# Patient Record
(demographics unavailable — no encounter records)

---

## 2024-10-08 NOTE — ADDENDUM
[FreeTextEntry1] : flu panel was positive for covid. Patient informed. discussed treatment options. patient would like to be treated. Rx paxlovid BID for 5 days, discussed side effects and interaction with other medication. Patient will stop atorvastatin while taking paxlovid. Not on advair at this time.

## 2024-10-08 NOTE — HISTORY OF PRESENT ILLNESS
[FreeTextEntry8] : 60 year old male presenting with cough, congestion, runny nose, body ache for 3 days. Reports he returned from [facility] recently. Covid tested at home and was negative. Taking nyquil which helps.

## 2024-10-08 NOTE — REVIEW OF SYSTEMS
[Nasal Discharge] : nasal discharge [Sore Throat] : sore throat [Cough] : cough [Headache] : headache [Negative] : Heme/Lymph

## 2024-10-08 NOTE — HISTORY OF PRESENT ILLNESS
[FreeTextEntry8] : 60 year old male presenting with cough, congestion, runny nose, body ache for 3 days. Reports he returned from [facility] recently. Covid tested at home and was negative. Taking nyquil which helps.

## 2024-10-08 NOTE — HEALTH RISK ASSESSMENT
[Yes] : Yes [1 or 2 (0 pts)] : 1 or 2 (0 points) [0] : 2) Feeling down, depressed, or hopeless: Not at all (0) [Current] : Current [20 or more] : 20 or more

## 2024-11-26 NOTE — HISTORY OF PRESENT ILLNESS
[___ wks] : [unfilled] week(s) ago [9] : an average pain level of 9/10 [7] : a minimum pain level of 7/10 [10] : a maximum pain level of 10/10 [Back Pain] : back pain [Aching] : aching [Burning] : burning [Laying] : laying [Standing] : standing [Sitting] : sitting [Medications] : medications [FreeTextEntry1] : HPI     Mr. TACO RUGGIERO is a 60 year M with pmhx of COPD, HLD, DM2 (last A1C- 9.8). ED visit on 11/16 for acute onset severe right lateral leg pain and paresthesias. Most severe with laying and standing. No relief with Roxicodone and Flexeril. NSAIDs modestly effective. Pain is impacting his quality of life, ability to perform certain ADL's and sleep. Denies any additional weakness, numbness, bowel/bladder dysfunction, history of bleeding disorders.    Previous and current pain medications/doses/effects: Advil     Previous Pain Treatments: PT     Previous Pain Injections: None     Previous Diagnostic Studies/Images: 11/16/2024 View Order (Report matches exam selected in Patient History pane)  Exam: XR HIP RIGHT W/PELVIS (COMMON) Order#: [id number]    Radiographs of the RIGHT hip  CLINICAL INFORMATION: RIGHT hip and buttock pain..  TECHNIQUE: AP and oblique views of the hip. AP pelvis view.  FINDINGS: No prior exams are available for comparison.  No fracture or dislocation.. The femoral acetabular joint space is preserved.  No soft tissue abnormality. Osseous pelvis intact.   IMPRESSION: No radiographic osseous pathology..   ======================================================================================  11/17/2024 View Order (Report matches exam selected in Patient History pane)  Exam: XR L S SPINE AP LAT Order#: [id number]    Radiographs of the lumbar spine  CLINICAL INFORMATION: RIGHT hip and buttock pain..  TECHNIQUE: AP and lateral lumbar spine radiographs FINDINGS: No previous examinations are available for review.  Lumbar vertebral alignment is maintained. Lumbar vertebral body heights are preserved. Pedicles are intact. No fracture or vertebral subluxation..  Lumbar intervertebral disc space heights are maintained.  No significant degenerative productive changes.  The sacroiliac joints are intact.   IMPRESSION: No radiographic lumbar spine osseous pathology. If pain persist despite conservative therapy and occult fracture or spinal canal/foraminal nerve root compression is clinically suspected, further assessment with CT or MRI recommended.   [FreeTextEntry7] : right lateral leg pain

## 2024-11-26 NOTE — ASSESSMENT
[FreeTextEntry1] : >> Imaging and Other Studies   I personally reviewed the relevant imaging.  Discussed and explained to patient the likely source of pathology and pain.  Questions answered. XR  back and leg pain likely secondary to lumbar radiculopathy and discogenic pain refractory to conservative treatments including 6 consecutive weeks of home exercises/PT, will obtain MRI LS to evaluate for pathology  may consider PT vs intervention pending eval >> Therapy and Other Modalities   PT  >> Medications  tizanidine prn spasm/pain  trial gabapentin uptitrate to TID cautioned change in mood.  Encouraged to call with any worsening mood or depression/suicidal ideations acetaminophen 650mg q8h prn pain (caution <3g daily)   >> Interventions   n/a  >> Consults   na  >> Discussion of Risks/Benefits/Alternatives    >Regarding any scheduled procedures:   In the event the patient is scheduled for a procedure,   I have discussed in detail with the patient that any interventional pain procedure is associated with potential risks.  The procedure may include an injection of steroids and potentially other medications (local anesthetic and normal saline) into the epidural space or surrounding tissue of the spine.  There are significant risks of this procedure which include and are not limited to infection, bleeding, worsening pain, dural puncture leading to postdural puncture headache, nerve damage, spinal cord injury, paralysis, stroke, and death.   There is a chance that the procedure does not improve their pain.   There are risks associated with the steroid being absorbed into the body systemically.  These include dysphoria, difficulty sleeping, mood swings and personality changes.  Premenopausal women may notice an irregularity in her menstrual cycle for 2-3 months following the injection.  Steroids can specifically affect patients with hypertension, diabetes, and peptic ulcers.  The procedure may cause a temporary increase in blood pressure and blood pressure, and may adversely affect a peptic ulcer.  Other, more rare complications, include avascular necrosis of joints, glaucoma and worsening of osteoporosis.   I have discussed the risks of the procedure at length with the patient, and the potential benefits of pain relief.  I have offered alternatives to the procedure.  All questions were answered.   The patient expressed understanding and wishes to proceed with the procedure.   > Longitudinal management of Complex Painful condition   The patient is being managed for a complex condition that requires ongoing management.  The nature of this condition demands nuanced approach to treatment.  The seriousness of the condition necessitates an in-depth and focused approach to management and coordination with other healthcare professionals.    This visit involves intricate evaluation and management of the patient's condition.  The complexity of the visit was due to the need for detailed assessment of the current state, consideration of potential complications and a careful balancing of treatment options to management the chronic condition effectively.   As detailed above, the patient has a chronic significant painful condition that requires regular and detailed management.  The condition's impact on the patient's quality of life and health is substantial and necessitates a comprehensive and tailored approach   >> Conclusion   There were no barriers to communication. Informed patient that I would be available for any additional questions. Patient was instructed to call with any worsening symptoms including severe pain, new numbness/weakness, or changes in the bowel/bladder function. Discussed role of nsaids in pain management and all relevant risks, if patient is continuing to require after 4 weeks the patient should f/u for alternative treatment. Instructed patient to maintain pain diary to monitor pain level, mobility, and function.

## 2024-12-17 NOTE — HISTORY OF PRESENT ILLNESS
[Back Pain] : back pain [___ wks] : [unfilled] week(s) ago [9] : an average pain level of 9/10 [7] : a minimum pain level of 7/10 [10] : a maximum pain level of 10/10 [Aching] : aching [Burning] : burning [Laying] : laying [Standing] : standing [Sitting] : sitting [Medications] : medications [FreeTextEntry1] : Interval Note:  Since last visit the pain is not improved.  Severe back and right leg pain.  Pain is so bad that patient finds it difficult to perform adls and ambulate. Gabapentin with mild improvement. Denies any additional weakness, numbness, bowel/bladder dysfunction.    HPI     Mr. TACO RUGGIERO is a 60 year M with pmhx of COPD, HLD, DM2 (last A1C- 9.8). ED visit on 11/16 for acute onset severe right lateral leg pain and paresthesias. Most severe with laying and standing. No relief with Roxicodone and Flexeril. NSAIDs modestly effective. Pain is impacting his quality of life, ability to perform certain ADL's and sleep. Denies any additional weakness, numbness, bowel/bladder dysfunction, history of bleeding disorders.    Previous and current pain medications/doses/effects: Advil     Previous Pain Treatments: PT     Previous Pain Injections: None     Previous Diagnostic Studies/Images:  MRI LS 12/24   LOCALIZER: Within normal limits.  BONES: Scattered multilevel chronic Schmorl's node formation. More subacute appearing Schmorl's node with surrounding edema seen along the superior endplate of L5 Fatty and sclerotic endplate changes of the inferior endplate of T12. Mild fatty endplate changes at L4-L5 and L5-S1. ALIGNMENT: No spondylolisthesis.  SACROILIAC JOINTS/SACRUM: There is no sacral fracture. The SI joints are partially visualized but are intact.  CONUS AND CAUDA EQUINA: The distal cord and conus are normal in signal. Conus terminates at T12-L1.   VISUALIZED INTRAPELVIC/INTRA-ABDOMINAL SOFT TISSUES: Colonic diverticulosis.  PARASPINAL SOFT TISSUES: Mild fatty infiltration of the posterior paraspinal musculature.    INDIVIDUAL LEVELS:   LOWER THORACIC SPINE: No spinal canal or neuroforaminal stenosis.   L1-L2: No spinal canal or neuroforaminal stenosis  L2-L3: There is mild disc bulge. There is mild flattening of the ventral thecal sac. There is mild right facet arthrosis with mild neuroforaminal narrowing. There is no spinal canal stenosis. There is no left neuroforaminal stenosis.  L3-L4: There is mild disc bulge. There is mild flattening of the ventral thecal sac. There is no spinal canal stenosis. Mild bilateral neural foraminal narrowing, greater on the left.  L4-L5: Diffuse disc bulge, asymmetric to the right with a superimposed central disc protrusion with annular fissure. There is moderate to severe right neuroforaminal narrowing. There is flattening of the ventral thecal sac with effacement of the left greater than right lateral recesses. Overall moderate central canal narrowing. There is mild left neuroforaminal stenosis.  L5-S1: Diffuse disc bulge posterior osseous ridging. Small left foraminal disc protrusion. Mild bilateral facet arthrosis. Findings result in severe left and mild right neural foraminal narrowing. Flattening of the ventral thecal sac without central canal narrowing.    IMPRESSION:   Multilevel lumbar spondylosis.   L4-L5: Central disc protrusion with annular fissure. Moderate to severe right neuroforaminal narrowing. There is flattening of the ventral thecal sac with effacement of the left greater than right lateral recesses. Overall moderate central canal narrowing. Mild left neuroforaminal stenosis.   L5-S1: Small left foraminal disc protrusion. Severe left and mild right neural foraminal narrowing.  11/16/2024 View Order (Report matches exam selected in Patient History pane)  Exam: XR HIP RIGHT W/PELVIS (COMMON) Order#: [id number]    Radiographs of the RIGHT hip  CLINICAL INFORMATION: RIGHT hip and buttock pain..  TECHNIQUE: AP and oblique views of the hip. AP pelvis view.  FINDINGS: No prior exams are available for comparison.  No fracture or dislocation.. The femoral acetabular joint space is preserved.  No soft tissue abnormality. Osseous pelvis intact.   IMPRESSION: No radiographic osseous pathology..   ======================================================================================  11/17/2024 View Order (Report matches exam selected in Patient History pane)  Exam: XR L S SPINE AP LAT Order#: [id number]    Radiographs of the lumbar spine  CLINICAL INFORMATION: RIGHT hip and buttock pain..  TECHNIQUE: AP and lateral lumbar spine radiographs FINDINGS: No previous examinations are available for review.  Lumbar vertebral alignment is maintained. Lumbar vertebral body heights are preserved. Pedicles are intact. No fracture or vertebral subluxation..  Lumbar intervertebral disc space heights are maintained.  No significant degenerative productive changes.  The sacroiliac joints are intact.   IMPRESSION: No radiographic lumbar spine osseous pathology. If pain persist despite conservative therapy and occult fracture or spinal canal/foraminal nerve root compression is clinically suspected, further assessment with CT or MRI recommended.   [FreeTextEntry7] : right lateral leg pain

## 2024-12-17 NOTE — ASSESSMENT
[FreeTextEntry1] : >> Imaging and Other Studies   I personally reviewed the relevant imaging.  Discussed and explained to patient the likely source of pathology and pain.  Questions answered. XR MRI   >> Therapy and Other Modalities   PT  >> Medications  tizanidine prn spasm/pain  gabapentin uptitrate to TID cautioned change in mood.  Encouraged to call with any worsening mood or depression/suicidal ideations acetaminophen 650mg q8h prn pain (caution <3g daily)   >> Interventions   Significant component of back and leg pain likely secondary to lumbar spinal stenosis demonstrated on MRI LS.  Will schedule L4-5 interlaminar epidural steroid injection r/b/a discussed   >> Consults   na  >> Discussion of Risks/Benefits/Alternatives    >Regarding any scheduled procedures:   In the event the patient is scheduled for a procedure,   I have discussed in detail with the patient that any interventional pain procedure is associated with potential risks.  The procedure may include an injection of steroids and potentially other medications (local anesthetic and normal saline) into the epidural space or surrounding tissue of the spine.  There are significant risks of this procedure which include and are not limited to infection, bleeding, worsening pain, dural puncture leading to postdural puncture headache, nerve damage, spinal cord injury, paralysis, stroke, and death.   There is a chance that the procedure does not improve their pain.   There are risks associated with the steroid being absorbed into the body systemically.  These include dysphoria, difficulty sleeping, mood swings and personality changes.  Premenopausal women may notice an irregularity in her menstrual cycle for 2-3 months following the injection.  Steroids can specifically affect patients with hypertension, diabetes, and peptic ulcers.  The procedure may cause a temporary increase in blood pressure and blood pressure, and may adversely affect a peptic ulcer.  Other, more rare complications, include avascular necrosis of joints, glaucoma and worsening of osteoporosis.   I have discussed the risks of the procedure at length with the patient, and the potential benefits of pain relief.  I have offered alternatives to the procedure.  All questions were answered.   The patient expressed understanding and wishes to proceed with the procedure.   > Longitudinal management of Complex Painful condition   The patient is being managed for a complex condition that requires ongoing management.  The nature of this condition demands nuanced approach to treatment.  The seriousness of the condition necessitates an in-depth and focused approach to management and coordination with other healthcare professionals.    This visit involves intricate evaluation and management of the patient's condition.  The complexity of the visit was due to the need for detailed assessment of the current state, consideration of potential complications and a careful balancing of treatment options to management the chronic condition effectively.   As detailed above, the patient has a chronic significant painful condition that requires regular and detailed management.  The condition's impact on the patient's quality of life and health is substantial and necessitates a comprehensive and tailored approach   >> Conclusion   There were no barriers to communication. Informed patient that I would be available for any additional questions. Patient was instructed to call with any worsening symptoms including severe pain, new numbness/weakness, or changes in the bowel/bladder function. Discussed role of nsaids in pain management and all relevant risks, if patient is continuing to require after 4 weeks the patient should f/u for alternative treatment. Instructed patient to maintain pain diary to monitor pain level, mobility, and function.

## 2025-01-03 NOTE — ASSESSMENT
[FreeTextEntry1] : >> Imaging and Other Studies   I personally reviewed the relevant imaging.  Discussed and explained to patient the likely source of pathology and pain.  Questions answered. XR MRI   >> Therapy and Other Modalities   PT  >> Medications  tizanidine prn spasm/pain restart gabapentin cautioned change in mood.  Encouraged to call with any worsening mood or depression/suicidal ideations acetaminophen 650mg q8h prn pain (caution <3g daily)   >> Interventions   Significant component of back and leg pain likely secondary to lumbar spinal stenosis demonstrated on MRI LS.  sp L4-5 interlaminar epidural steroid injection with 70%  improvement in back and leg pain    >> Consults   na  >> Discussion of Risks/Benefits/Alternatives    >Regarding any scheduled procedures:   In the event the patient is scheduled for a procedure,   I have discussed in detail with the patient that any interventional pain procedure is associated with potential risks.  The procedure may include an injection of steroids and potentially other medications (local anesthetic and normal saline) into the epidural space or surrounding tissue of the spine.  There are significant risks of this procedure which include and are not limited to infection, bleeding, worsening pain, dural puncture leading to postdural puncture headache, nerve damage, spinal cord injury, paralysis, stroke, and death.   There is a chance that the procedure does not improve their pain.   There are risks associated with the steroid being absorbed into the body systemically.  These include dysphoria, difficulty sleeping, mood swings and personality changes.  Premenopausal women may notice an irregularity in her menstrual cycle for 2-3 months following the injection.  Steroids can specifically affect patients with hypertension, diabetes, and peptic ulcers.  The procedure may cause a temporary increase in blood pressure and blood pressure, and may adversely affect a peptic ulcer.  Other, more rare complications, include avascular necrosis of joints, glaucoma and worsening of osteoporosis.   I have discussed the risks of the procedure at length with the patient, and the potential benefits of pain relief.  I have offered alternatives to the procedure.  All questions were answered.   The patient expressed understanding and wishes to proceed with the procedure.   > Longitudinal management of Complex Painful condition   The patient is being managed for a complex condition that requires ongoing management.  The nature of this condition demands nuanced approach to treatment.  The seriousness of the condition necessitates an in-depth and focused approach to management and coordination with other healthcare professionals.    This visit involves intricate evaluation and management of the patient's condition.  The complexity of the visit was due to the need for detailed assessment of the current state, consideration of potential complications and a careful balancing of treatment options to management the chronic condition effectively.   As detailed above, the patient has a chronic significant painful condition that requires regular and detailed management.  The condition's impact on the patient's quality of life and health is substantial and necessitates a comprehensive and tailored approach   >> Conclusion   There were no barriers to communication. Informed patient that I would be available for any additional questions. Patient was instructed to call with any worsening symptoms including severe pain, new numbness/weakness, or changes in the bowel/bladder function. Discussed role of nsaids in pain management and all relevant risks, if patient is continuing to require after 4 weeks the patient should f/u for alternative treatment. Instructed patient to maintain pain diary to monitor pain level, mobility, and function.

## 2025-01-03 NOTE — HISTORY OF PRESENT ILLNESS
[Back Pain] : back pain [___ wks] : [unfilled] week(s) ago [9] : an average pain level of 9/10 [7] : a minimum pain level of 7/10 [10] : a maximum pain level of 10/10 [Aching] : aching [Burning] : burning [Laying] : laying [Standing] : standing [Sitting] : sitting [Medications] : medications [FreeTextEntry1] : Interval Note: sp  L4-5 interlaminar epidural steroid injection with 70% improvement in back and right leg pain.  Minimal difficulty performing adls.  Denies any additional weakness, numbness, bowel/bladder dysfunction.    HPI     Mr. TACO RUGGIERO is a 60 year M with pmhx of COPD, HLD, DM2 (last A1C- 9.8). ED visit on 11/16 for acute onset severe right lateral leg pain and paresthesias. Most severe with laying and standing. No relief with Roxicodone and Flexeril. NSAIDs modestly effective. Pain is impacting his quality of life, ability to perform certain ADL's and sleep. Denies any additional weakness, numbness, bowel/bladder dysfunction, history of bleeding disorders.    Previous and current pain medications/doses/effects: Advil     Previous Pain Treatments: PT     Previous Pain Injections:    L4-5 interlaminar epidural steroid injection     Previous Diagnostic Studies/Images:  MRI LS 12/24   LOCALIZER: Within normal limits.  BONES: Scattered multilevel chronic Schmorl's node formation. More subacute appearing Schmorl's node with surrounding edema seen along the superior endplate of L5 Fatty and sclerotic endplate changes of the inferior endplate of T12. Mild fatty endplate changes at L4-L5 and L5-S1. ALIGNMENT: No spondylolisthesis.  SACROILIAC JOINTS/SACRUM: There is no sacral fracture. The SI joints are partially visualized but are intact.  CONUS AND CAUDA EQUINA: The distal cord and conus are normal in signal. Conus terminates at T12-L1.   VISUALIZED INTRAPELVIC/INTRA-ABDOMINAL SOFT TISSUES: Colonic diverticulosis.  PARASPINAL SOFT TISSUES: Mild fatty infiltration of the posterior paraspinal musculature.    INDIVIDUAL LEVELS:   LOWER THORACIC SPINE: No spinal canal or neuroforaminal stenosis.   L1-L2: No spinal canal or neuroforaminal stenosis  L2-L3: There is mild disc bulge. There is mild flattening of the ventral thecal sac. There is mild right facet arthrosis with mild neuroforaminal narrowing. There is no spinal canal stenosis. There is no left neuroforaminal stenosis.  L3-L4: There is mild disc bulge. There is mild flattening of the ventral thecal sac. There is no spinal canal stenosis. Mild bilateral neural foraminal narrowing, greater on the left.  L4-L5: Diffuse disc bulge, asymmetric to the right with a superimposed central disc protrusion with annular fissure. There is moderate to severe right neuroforaminal narrowing. There is flattening of the ventral thecal sac with effacement of the left greater than right lateral recesses. Overall moderate central canal narrowing. There is mild left neuroforaminal stenosis.  L5-S1: Diffuse disc bulge posterior osseous ridging. Small left foraminal disc protrusion. Mild bilateral facet arthrosis. Findings result in severe left and mild right neural foraminal narrowing. Flattening of the ventral thecal sac without central canal narrowing.    IMPRESSION:   Multilevel lumbar spondylosis.   L4-L5: Central disc protrusion with annular fissure. Moderate to severe right neuroforaminal narrowing. There is flattening of the ventral thecal sac with effacement of the left greater than right lateral recesses. Overall moderate central canal narrowing. Mild left neuroforaminal stenosis.   L5-S1: Small left foraminal disc protrusion. Severe left and mild right neural foraminal narrowing.  11/16/2024 View Order (Report matches exam selected in Patient History pane)  Exam: XR HIP RIGHT W/PELVIS (COMMON) Order#: [id number]    Radiographs of the RIGHT hip  CLINICAL INFORMATION: RIGHT hip and buttock pain..  TECHNIQUE: AP and oblique views of the hip. AP pelvis view.  FINDINGS: No prior exams are available for comparison.  No fracture or dislocation.. The femoral acetabular joint space is preserved.  No soft tissue abnormality. Osseous pelvis intact.   IMPRESSION: No radiographic osseous pathology..   ======================================================================================  11/17/2024 View Order (Report matches exam selected in Patient History pane)  Exam: XR L S SPINE AP LAT Order#: [id number]    Radiographs of the lumbar spine  CLINICAL INFORMATION: RIGHT hip and buttock pain..  TECHNIQUE: AP and lateral lumbar spine radiographs FINDINGS: No previous examinations are available for review.  Lumbar vertebral alignment is maintained. Lumbar vertebral body heights are preserved. Pedicles are intact. No fracture or vertebral subluxation..  Lumbar intervertebral disc space heights are maintained.  No significant degenerative productive changes.  The sacroiliac joints are intact.   IMPRESSION: No radiographic lumbar spine osseous pathology. If pain persist despite conservative therapy and occult fracture or spinal canal/foraminal nerve root compression is clinically suspected, further assessment with CT or MRI recommended.   [FreeTextEntry7] : right lateral leg pain

## 2025-01-14 NOTE — REVIEW OF SYSTEMS
[Recent Weight Loss (___ Lbs)] : recent weight loss: [unfilled] lbs [Negative] : Heme/Lymph [FreeTextEntry6] : excessive snoring had negative sleep study few years ago.

## 2025-01-14 NOTE — HISTORY OF PRESENT ILLNESS
[FreeTextEntry1] : 61 yo WM  hx of Type 2 DM uncontrolled, noncompliant patient, today in follow up, did not take am insulin as he was "in a hurry" reports forgetting am shot about twice a week  feels tired, weak , despite decreasing Trulicity to 1,5mg every other week as 4.5mg was making him nauseated and having no appetite.  patient stopped Trulicity as he was concerned that he lost too much weight, restarted 2 weeks ago  was taking Humalog only once a day and no oral antidiabetics , denies depression, but has had polyuria, polydipsia and weight loss per pt and his wife today present  missing evening insulin and morning one about 4-5 times in the last month per pt  has h/o noncompliance   last seen May 2020       was  on Ozempic 1mg weekly started 2/1/19 no side effects this replaced Tanzeum stopped May 2020 misunderstood my advise today A1c out of control 10.5        Invokana 300mg tab TAKE 1 TABLET BY MOUTH EVERY DAY however in the chat last Rx was 2/2020          Metformin 1g bid Pioglitazone 45mg qam  Humalog mix 75/25 10 units before breakfast and 10units before dinner bid  Trulicity 3 mg q week           Hx of Type 2 DM for 4-5 years         Repeat A1c 8.9 12/18, 8.4 on 2/20        prior regimen : " VGo 40 since 9/2016, On Actoplus 15-850mg PO BID, Invokana 300mg PO Qdaily. Tolerating medications well with no sign. side effects. Drinking plenty of water. Takes at night. Only wakes up 1-2 x per night. No UTI, Fungal, No dizziness, no hematuria."        Did not bring in meter or glucometer. I check "if I feel a little off"        Checks about 2-3 x per week        Checks BG levels via freestyle yadi getting better for the last few days in 150-200s                BP well controlled. 128/70. Currently on no ACEi.         Cr 0.77, eGFR >60. AST 16, ALT 24, Alk phos 101.         Currently on Lipitor and Lovaza 2g PO Qdaily.        Last opthalmology 1months ago. Dr. Starkey. Wears reading glasses. No blurry vision or loss of vision.         Denies neuropathy.         TSH 1.15        thyroid US no nodules 5/2015        Hgb 17.0 nl is 16.3, Hct 50.4. No snoring at night. Sent for Sleep study with Shivani Harvey. Neg for sleep apnea.         Blood work completed 12/19/16:        WBC 10.2, Hgb 17.0, Hct 50.4,Plt 268        UA + glucose (on invokana)        CMP: Na 142, k 4.5, bun 9, cr 0.77, LFTs nl        ,  not fasting  4/23/24 follow up-  Sugar 160 today.  A1c went up to 9.7%.  Freestyle Yadi 3 reviewed with pt. He has highs above 350.  When numbers are normal he does not feel good. Not used to it. Feels symptomatic at 115.  He does enjoy using the Yadi and finds it helpful.   Meds:  - Humalog Mix 14-15 U before breakfast and 14-15 U before dinner. Reports 90% compliance. He takes it at different times every day depending on his work schedule. Sometimes has to eat at work without his medications and then waits until getting home to take the shot.  - Trulicity 1.5 mg every other week. Switched from weekly to biweekly because he was losing weight.  - Pioglitazone 45 mg qd  - Metformin 500 mg BID  - Invokana 300 mg qd   01/14/25 He is doing okay. He pulled his back right and had to an epidural from Dr. Ngo in his spine a week before Christmas. MRI Lumbar Spine was done in December 2024. He was on Gabapentin and muscle relaxants. He states he is frequently tired. He feels he is experiencing polyuria and polydipsia. He denies any chest pain, heart racing, double vision, blurry vision.

## 2025-01-14 NOTE — PHYSICAL EXAM
[Alert] : alert [Well Nourished] : well nourished [No Acute Distress] : no acute distress [Well Developed] : well developed [Normal Sclera/Conjunctiva] : normal sclera/conjunctiva [EOMI] : extra ocular movement intact [No Proptosis] : no proptosis [Normal Oropharynx] : the oropharynx was normal [No Thyroid Nodules] : no palpable thyroid nodules [No Respiratory Distress] : no respiratory distress [No Accessory Muscle Use] : no accessory muscle use [Clear to Auscultation] : lungs were clear to auscultation bilaterally [Normal S1, S2] : normal S1 and S2 [Normal Rate] : heart rate was normal [No Edema] : no peripheral edema [Normal Bowel Sounds] : normal bowel sounds [Not Tender] : non-tender [Not Distended] : not distended [Soft] : abdomen soft [Normal Anterior Cervical Nodes] : no anterior cervical lymphadenopathy [No Spinal Tenderness] : no spinal tenderness [Spine Straight] : spine straight [No Stigmata of Cushings Syndrome] : no stigmata of Cushings Syndrome [Normal Gait] : normal gait [Normal Strength/Tone] : muscle strength and tone were normal [No Rash] : no rash [Normal Reflexes] : deep tendon reflexes were 2+ and symmetric [No Tremors] : no tremors [Oriented x3] : oriented to person, place, and time [Normal PMI] : the apical impulse was normal [Regular Rhythm] : with a regular rhythm [Acanthosis Nigricans] : no acanthosis nigricans [de-identified] : mildly enlarged thyroid on exam  [de-identified] : Slight heart murmur

## 2025-01-14 NOTE — END OF VISIT
[Time Spent: ___ minutes] : I have spent [unfilled] minutes of time on the encounter which excludes teaching and separately reported services. [FreeTextEntry3] :  I, Meagan Ireland, am scribing for and in the presence of Dr. Mirlande Garcia in the following sections: HISTORY OF PRESENT ILLNESS; REVIEW OF SYSTEMS; PHYSICAL EXAM; ASSESSMENT/ PLAN. I, Mirlande Garcia, personally performed the services described in the documentation, reviewed the documentation recorded by the scribe in my presence, and it accurately and completely records my words and actions. 01/14/25

## 2025-02-04 NOTE — PHYSICAL EXAM
[Normal muscle bulk without asymmetry] : normal muscle bulk without asymmetry [Normal] : Gait: normal [] : Motor: [Facet Tenderness] : no facet tenderness

## 2025-02-04 NOTE — HISTORY OF PRESENT ILLNESS
[Back Pain] : back pain [___ wks] : [unfilled] week(s) ago [9] : an average pain level of 9/10 [7] : a minimum pain level of 7/10 [10] : a maximum pain level of 10/10 [Aching] : aching [Burning] : burning [Laying] : laying [Standing] : standing [Sitting] : sitting [Medications] : medications [FreeTextEntry1] : Interval Note:  sp  L4-5 interlaminar epidural steroid injection with 70% improvement in back and right leg pain. Continues gabapentin without medication adverse effects. Completed PT, continues to require tizanidine prn  Minimal difficulty performing adls.  Denies any additional weakness, numbness, bowel/bladder dysfunction.    HPI     Mr. TACO RUGGIERO is a 60 year M with pmhx of COPD, HLD, DM2 (last A1C- 9.8). ED visit on 11/16 for acute onset severe right lateral leg pain and paresthesias. Most severe with laying and standing. No relief with Roxicodone and Flexeril. NSAIDs modestly effective. Pain is impacting his quality of life, ability to perform certain ADL's and sleep. Denies any additional weakness, numbness, bowel/bladder dysfunction, history of bleeding disorders.    Previous and current pain medications/doses/effects: Advil     Previous Pain Treatments: PT     Previous Pain Injections:    L4-5 interlaminar epidural steroid injection     Previous Diagnostic Studies/Images:  MRI LS 12/24   LOCALIZER: Within normal limits.  BONES: Scattered multilevel chronic Schmorl's node formation. More subacute appearing Schmorl's node with surrounding edema seen along the superior endplate of L5 Fatty and sclerotic endplate changes of the inferior endplate of T12. Mild fatty endplate changes at L4-L5 and L5-S1. ALIGNMENT: No spondylolisthesis.  SACROILIAC JOINTS/SACRUM: There is no sacral fracture. The SI joints are partially visualized but are intact.  CONUS AND CAUDA EQUINA: The distal cord and conus are normal in signal. Conus terminates at T12-L1.   VISUALIZED INTRAPELVIC/INTRA-ABDOMINAL SOFT TISSUES: Colonic diverticulosis.  PARASPINAL SOFT TISSUES: Mild fatty infiltration of the posterior paraspinal musculature.    INDIVIDUAL LEVELS:   LOWER THORACIC SPINE: No spinal canal or neuroforaminal stenosis.   L1-L2: No spinal canal or neuroforaminal stenosis  L2-L3: There is mild disc bulge. There is mild flattening of the ventral thecal sac. There is mild right facet arthrosis with mild neuroforaminal narrowing. There is no spinal canal stenosis. There is no left neuroforaminal stenosis.  L3-L4: There is mild disc bulge. There is mild flattening of the ventral thecal sac. There is no spinal canal stenosis. Mild bilateral neural foraminal narrowing, greater on the left.  L4-L5: Diffuse disc bulge, asymmetric to the right with a superimposed central disc protrusion with annular fissure. There is moderate to severe right neuroforaminal narrowing. There is flattening of the ventral thecal sac with effacement of the left greater than right lateral recesses. Overall moderate central canal narrowing. There is mild left neuroforaminal stenosis.  L5-S1: Diffuse disc bulge posterior osseous ridging. Small left foraminal disc protrusion. Mild bilateral facet arthrosis. Findings result in severe left and mild right neural foraminal narrowing. Flattening of the ventral thecal sac without central canal narrowing.    IMPRESSION:   Multilevel lumbar spondylosis.   L4-L5: Central disc protrusion with annular fissure. Moderate to severe right neuroforaminal narrowing. There is flattening of the ventral thecal sac with effacement of the left greater than right lateral recesses. Overall moderate central canal narrowing. Mild left neuroforaminal stenosis.   L5-S1: Small left foraminal disc protrusion. Severe left and mild right neural foraminal narrowing.  11/16/2024 View Order (Report matches exam selected in Patient History pane)  Exam: XR HIP RIGHT W/PELVIS (COMMON) Order#: [id number]    Radiographs of the RIGHT hip  CLINICAL INFORMATION: RIGHT hip and buttock pain..  TECHNIQUE: AP and oblique views of the hip. AP pelvis view.  FINDINGS: No prior exams are available for comparison.  No fracture or dislocation.. The femoral acetabular joint space is preserved.  No soft tissue abnormality. Osseous pelvis intact.   IMPRESSION: No radiographic osseous pathology..   ======================================================================================  11/17/2024 View Order (Report matches exam selected in Patient History pane)  Exam: XR L S SPINE AP LAT Order#: [id number]    Radiographs of the lumbar spine  CLINICAL INFORMATION: RIGHT hip and buttock pain..  TECHNIQUE: AP and lateral lumbar spine radiographs FINDINGS: No previous examinations are available for review.  Lumbar vertebral alignment is maintained. Lumbar vertebral body heights are preserved. Pedicles are intact. No fracture or vertebral subluxation..  Lumbar intervertebral disc space heights are maintained.  No significant degenerative productive changes.  The sacroiliac joints are intact.   IMPRESSION: No radiographic lumbar spine osseous pathology. If pain persist despite conservative therapy and occult fracture or spinal canal/foraminal nerve root compression is clinically suspected, further assessment with CT or MRI recommended.   [FreeTextEntry7] : right lateral leg pain

## 2025-02-04 NOTE — ASSESSMENT
[FreeTextEntry1] : >> Imaging and Other Studies   I personally reviewed the relevant imaging.  Discussed and explained to patient the likely source of pathology and pain.  Questions answered. XR MRI   >> Therapy and Other Modalities   PT  >> Medications  wean tizanidine prn spasm/pain continue gabapentin cautioned change in mood.  Encouraged to call with any worsening mood or depression/suicidal ideations acetaminophen 650mg q8h prn pain (caution <3g daily)   >> Interventions   Significant component of back and leg pain likely secondary to lumbar spinal stenosis demonstrated on MRI LS.  sp L4-5 interlaminar epidural steroid injection with 70%  improvement in back and leg pain    >> Consults   no work restrictions as patient has full function and minimal pain  >> Discussion of Risks/Benefits/Alternatives    >Regarding any scheduled procedures:   In the event the patient is scheduled for a procedure,   I have discussed in detail with the patient that any interventional pain procedure is associated with potential risks.  The procedure may include an injection of steroids and potentially other medications (local anesthetic and normal saline) into the epidural space or surrounding tissue of the spine.  There are significant risks of this procedure which include and are not limited to infection, bleeding, worsening pain, dural puncture leading to postdural puncture headache, nerve damage, spinal cord injury, paralysis, stroke, and death.   There is a chance that the procedure does not improve their pain.   There are risks associated with the steroid being absorbed into the body systemically.  These include dysphoria, difficulty sleeping, mood swings and personality changes.  Premenopausal women may notice an irregularity in her menstrual cycle for 2-3 months following the injection.  Steroids can specifically affect patients with hypertension, diabetes, and peptic ulcers.  The procedure may cause a temporary increase in blood pressure and blood pressure, and may adversely affect a peptic ulcer.  Other, more rare complications, include avascular necrosis of joints, glaucoma and worsening of osteoporosis.   I have discussed the risks of the procedure at length with the patient, and the potential benefits of pain relief.  I have offered alternatives to the procedure.  All questions were answered.   The patient expressed understanding and wishes to proceed with the procedure.   > Longitudinal management of Complex Painful condition   The patient is being managed for a complex condition that requires ongoing management.  The nature of this condition demands nuanced approach to treatment.  The seriousness of the condition necessitates an in-depth and focused approach to management and coordination with other healthcare professionals.    This visit involves intricate evaluation and management of the patient's condition.  The complexity of the visit was due to the need for detailed assessment of the current state, consideration of potential complications and a careful balancing of treatment options to management the chronic condition effectively.   As detailed above, the patient has a chronic significant painful condition that requires regular and detailed management.  The condition's impact on the patient's quality of life and health is substantial and necessitates a comprehensive and tailored approach   >> Conclusion   There were no barriers to communication. Informed patient that I would be available for any additional questions. Patient was instructed to call with any worsening symptoms including severe pain, new numbness/weakness, or changes in the bowel/bladder function. Discussed role of nsaids in pain management and all relevant risks, if patient is continuing to require after 4 weeks the patient should f/u for alternative treatment. Instructed patient to maintain pain diary to monitor pain level, mobility, and function.

## 2025-03-17 NOTE — ASSESSMENT
[Diabetes Foot Care] : diabetes foot care [Carbohydrate Consistent Diet] : carbohydrate consistent diet [Exercise/Effect on Glucose] : exercise/effect on glucose [Self Monitoring of Blood Glucose] : self monitoring of blood glucose [Retinopathy Screening] : Patient was referred to ophthalmology for retinopathy screening [Little interest or pleasure doing things] : 1) Little interest or pleasure doing things [Feeling down, depressed, or hopeless] : 2) Feeling down, depressed, or hopeless [0] : 2) Feeling down, depressed, or hopeless: Not at all (0) [PHQ-2 Negative - No further assessment needed] : PHQ-2 Negative - No further assessment needed [FreeTextEntry1] : 5min spent on depression screening [[id number]] : 0

## 2025-03-17 NOTE — HISTORY OF PRESENT ILLNESS
[FreeTextEntry1] : 59 yo WM  hx of Type 2 DM uncontrolled, noncompliant patient, today in follow up, did not take am insulin as he was "in a hurry" reports forgetting am shot about twice a week  feels tired, weak , despite decreasing Trulicity to 1,5mg every other week as 4.5mg was making him nauseated and having no appetite.  patient stopped Trulicity as he was concerned that he lost too much weight, restarted 2 weeks ago  was taking Humalog only once a day and no oral antidiabetics , denies depression, but has had polyuria, polydipsia and weight loss per pt and his wife today present  missing evening insulin and morning one about 4-5 times in the last month per pt  has h/o noncompliance   last seen May 2020       was  on Ozempic 1mg weekly started 2/1/19 no side effects this replaced Tanzeum stopped May 2020 misunderstood my advise today A1c out of control 10.5        Invokana 300mg tab TAKE 1 TABLET BY MOUTH EVERY DAY however in the chat last Rx was 2/2020          Metformin 1g bid Pioglitazone 45mg qam  Humalog mix 75/25 10 units before breakfast and 10units before dinner bid  Trulicity 3 mg q week           Hx of Type 2 DM for 4-5 years         Repeat A1c 8.9 12/18, 8.4 on 2/20        prior regimen : " VGo 40 since 9/2016, On Actoplus 15-850mg PO BID, Invokana 300mg PO Qdaily. Tolerating medications well with no sign. side effects. Drinking plenty of water. Takes at night. Only wakes up 1-2 x per night. No UTI, Fungal, No dizziness, no hematuria."        Did not bring in meter or glucometer. I check "if I feel a little off"        Checks about 2-3 x per week        Checks BG levels via freestyle yadi getting better for the last few days in 150-200s                BP well controlled. 128/70. Currently on no ACEi.         Cr 0.77, eGFR >60. AST 16, ALT 24, Alk phos 101.         Currently on Lipitor and Lovaza 2g PO Qdaily.        Last opthalmology 1months ago. Dr. Starkey. Wears reading glasses. No blurry vision or loss of vision.         Denies neuropathy.         TSH 1.15        thyroid US no nodules 5/2015        Hgb 17.0 nl is 16.3, Hct 50.4. No snoring at night. Sent for Sleep study with Shivani Harvey. Neg for sleep apnea.         Blood work completed 12/19/16:        WBC 10.2, Hgb 17.0, Hct 50.4,Plt 268        UA + glucose (on invokana)        CMP: Na 142, k 4.5, bun 9, cr 0.77, LFTs nl        ,  not fasting  4/23/24 follow up-  Sugar 160 today.  A1c went up to 9.7%.  Freestyle Yadi 3 reviewed with pt. He has highs above 350.  When numbers are normal he does not feel good. Not used to it. Feels symptomatic at 115.  He does enjoy using the Yadi and finds it helpful.   Meds:  - Humalog Mix 14-15 U before breakfast and 14-15 U before dinner. Reports 90% compliance. He takes it at different times every day depending on his work schedule. Sometimes has to eat at work without his medications and then waits until getting home to take the shot.  - Trulicity 1.5 mg every other week. Switched from weekly to biweekly because he was losing weight.  - Pioglitazone 45 mg qd  - Metformin 500 mg BID  - Invokana 300 mg qd   01/14/25 He is doing okay. He pulled his back right and had to an epidural from Dr. Ngo in his spine a week before Christmas. MRI Lumbar Spine was done in December 2024. He was on Gabapentin and muscle relaxants. He states he is frequently tired. He feels he is experiencing polyuria and polydipsia. He denies any chest pain, heart racing, double vision, blurry vision.   03/10/25 Patient is okay. He pulled a muscle in his back while helping nephew. Saw Dr. Ngo and got a steroid shot February 4th.

## 2025-03-17 NOTE — HISTORY OF PRESENT ILLNESS
[FreeTextEntry1] : 61 yo WM  hx of Type 2 DM uncontrolled, noncompliant patient, today in follow up, did not take am insulin as he was "in a hurry" reports forgetting am shot about twice a week  feels tired, weak , despite decreasing Trulicity to 1,5mg every other week as 4.5mg was making him nauseated and having no appetite.  patient stopped Trulicity as he was concerned that he lost too much weight, restarted 2 weeks ago  was taking Humalog only once a day and no oral antidiabetics , denies depression, but has had polyuria, polydipsia and weight loss per pt and his wife today present  missing evening insulin and morning one about 4-5 times in the last month per pt  has h/o noncompliance   last seen May 2020       was  on Ozempic 1mg weekly started 2/1/19 no side effects this replaced Tanzeum stopped May 2020 misunderstood my advise today A1c out of control 10.5        Invokana 300mg tab TAKE 1 TABLET BY MOUTH EVERY DAY however in the chat last Rx was 2/2020          Metformin 1g bid Pioglitazone 45mg qam  Humalog mix 75/25 10 units before breakfast and 10units before dinner bid  Trulicity 3 mg q week           Hx of Type 2 DM for 4-5 years         Repeat A1c 8.9 12/18, 8.4 on 2/20        prior regimen : " VGo 40 since 9/2016, On Actoplus 15-850mg PO BID, Invokana 300mg PO Qdaily. Tolerating medications well with no sign. side effects. Drinking plenty of water. Takes at night. Only wakes up 1-2 x per night. No UTI, Fungal, No dizziness, no hematuria."        Did not bring in meter or glucometer. I check "if I feel a little off"        Checks about 2-3 x per week        Checks BG levels via freestyle yadi getting better for the last few days in 150-200s                BP well controlled. 128/70. Currently on no ACEi.         Cr 0.77, eGFR >60. AST 16, ALT 24, Alk phos 101.         Currently on Lipitor and Lovaza 2g PO Qdaily.        Last opthalmology 1months ago. Dr. Starkey. Wears reading glasses. No blurry vision or loss of vision.         Denies neuropathy.         TSH 1.15        thyroid US no nodules 5/2015        Hgb 17.0 nl is 16.3, Hct 50.4. No snoring at night. Sent for Sleep study with Shivani Harvey. Neg for sleep apnea.         Blood work completed 12/19/16:        WBC 10.2, Hgb 17.0, Hct 50.4,Plt 268        UA + glucose (on invokana)        CMP: Na 142, k 4.5, bun 9, cr 0.77, LFTs nl        ,  not fasting  4/23/24 follow up-  Sugar 160 today.  A1c went up to 9.7%.  Freestyle Yadi 3 reviewed with pt. He has highs above 350.  When numbers are normal he does not feel good. Not used to it. Feels symptomatic at 115.  He does enjoy using the Yadi and finds it helpful.   Meds:  - Humalog Mix 14-15 U before breakfast and 14-15 U before dinner. Reports 90% compliance. He takes it at different times every day depending on his work schedule. Sometimes has to eat at work without his medications and then waits until getting home to take the shot.  - Trulicity 1.5 mg every other week. Switched from weekly to biweekly because he was losing weight.  - Pioglitazone 45 mg qd  - Metformin 500 mg BID  - Invokana 300 mg qd   01/14/25 He is doing okay. He pulled his back right and had to an epidural from Dr. Ngo in his spine a week before Christmas. MRI Lumbar Spine was done in December 2024. He was on Gabapentin and muscle relaxants. He states he is frequently tired. He feels he is experiencing polyuria and polydipsia. He denies any chest pain, heart racing, double vision, blurry vision.   03/10/25 Patient is okay. He pulled a muscle in his back while helping nephew. Saw Dr. Ngo and got a steroid shot February 4th.

## 2025-03-17 NOTE — PHYSICAL EXAM
[Alert] : alert [Well Nourished] : well nourished [No Acute Distress] : no acute distress [Well Developed] : well developed [Normal Sclera/Conjunctiva] : normal sclera/conjunctiva [EOMI] : extra ocular movement intact [No Proptosis] : no proptosis [Normal Oropharynx] : the oropharynx was normal [No Thyroid Nodules] : no palpable thyroid nodules [No Respiratory Distress] : no respiratory distress [No Accessory Muscle Use] : no accessory muscle use [Clear to Auscultation] : lungs were clear to auscultation bilaterally [Normal PMI] : the apical impulse was normal [Normal S1, S2] : normal S1 and S2 [Normal Rate] : heart rate was normal [Regular Rhythm] : with a regular rhythm [No Edema] : no peripheral edema [Normal Bowel Sounds] : normal bowel sounds [Not Tender] : non-tender [Not Distended] : not distended [Soft] : abdomen soft [Normal Anterior Cervical Nodes] : no anterior cervical lymphadenopathy [No Spinal Tenderness] : no spinal tenderness [Spine Straight] : spine straight [No Stigmata of Cushings Syndrome] : no stigmata of Cushings Syndrome [Normal Gait] : normal gait [Normal Strength/Tone] : muscle strength and tone were normal [No Rash] : no rash [Normal Reflexes] : deep tendon reflexes were 2+ and symmetric [No Tremors] : no tremors [Oriented x3] : oriented to person, place, and time [Acanthosis Nigricans] : no acanthosis nigricans [de-identified] : mildly enlarged thyroid on exam  [de-identified] : Slight heart murmur

## 2025-03-17 NOTE — PHYSICAL EXAM
[Alert] : alert [Well Nourished] : well nourished [No Acute Distress] : no acute distress [Well Developed] : well developed [Normal Sclera/Conjunctiva] : normal sclera/conjunctiva [EOMI] : extra ocular movement intact [No Proptosis] : no proptosis [Normal Oropharynx] : the oropharynx was normal [No Thyroid Nodules] : no palpable thyroid nodules [No Respiratory Distress] : no respiratory distress [No Accessory Muscle Use] : no accessory muscle use [Clear to Auscultation] : lungs were clear to auscultation bilaterally [Normal PMI] : the apical impulse was normal [Normal S1, S2] : normal S1 and S2 [Normal Rate] : heart rate was normal [Regular Rhythm] : with a regular rhythm [No Edema] : no peripheral edema [Normal Bowel Sounds] : normal bowel sounds [Not Tender] : non-tender [Not Distended] : not distended [Soft] : abdomen soft [Normal Anterior Cervical Nodes] : no anterior cervical lymphadenopathy [No Spinal Tenderness] : no spinal tenderness [Spine Straight] : spine straight [No Stigmata of Cushings Syndrome] : no stigmata of Cushings Syndrome [Normal Gait] : normal gait [Normal Strength/Tone] : muscle strength and tone were normal [No Rash] : no rash [Normal Reflexes] : deep tendon reflexes were 2+ and symmetric [No Tremors] : no tremors [Oriented x3] : oriented to person, place, and time [Acanthosis Nigricans] : no acanthosis nigricans [de-identified] : mildly enlarged thyroid on exam  [de-identified] : Slight heart murmur

## 2025-03-17 NOTE — END OF VISIT
[Time Spent: ___ minutes] : I have spent [unfilled] minutes of time on the encounter which excludes teaching and separately reported services. [FreeTextEntry3] :  I, Wolfgang Soler, am scribing for and in the presence of Dr. Mirlande Garcia in the following sections: HISTORY OF PRESENT ILLNESS; REVIEW OF SYSTEMS; PHYSICAL EXAM; ASSESSMENT/ PLAN.I, Mirlande Garcia, personally performed the services described in the documentation, reviewed the documentation recorded by the scribe in my presence, and it accurately and completely records my words and actions. 03/10/25

## 2025-03-17 NOTE — ASSESSMENT
[Diabetes Foot Care] : diabetes foot care [Carbohydrate Consistent Diet] : carbohydrate consistent diet [Exercise/Effect on Glucose] : exercise/effect on glucose [Self Monitoring of Blood Glucose] : self monitoring of blood glucose [Retinopathy Screening] : Patient was referred to ophthalmology for retinopathy screening [Little interest or pleasure doing things] : 1) Little interest or pleasure doing things [Feeling down, depressed, or hopeless] : 2) Feeling down, depressed, or hopeless [0] : 2) Feeling down, depressed, or hopeless: Not at all (0) [PHQ-2 Negative - No further assessment needed] : PHQ-2 Negative - No further assessment needed [FreeTextEntry1] : 5min spent on depression screening [[id number]] : 0

## 2025-03-18 NOTE — HISTORY OF PRESENT ILLNESS
[FreeTextEntry8] : 60 yr old male here today c/o cough, headaches, clogged ears, body aches for 5 days.  No fever.  home covid test negative   URI Cough, congestion, runny nose Onset: 5 days ago  Fever: no fever  Last med: morning dayquil  Breathing problems: none  PO intake: normal Sick contacts: no  Covid exposure: No Better with otc meds but didnt get relief  Worse with night  Associated symptoms: no diarrhea but yes myalgia

## 2025-03-18 NOTE — PLAN
[FreeTextEntry1] :  Supportive care for viral URI if worsens at day 10-14 we will consider antibiotics for sinus infection

## 2025-03-18 NOTE — HEALTH RISK ASSESSMENT
[No falls in past year] : Patient reported no falls in the past year [Little interest or pleasure doing things] : 1) Little interest or pleasure doing things [Feeling down, depressed, or hopeless] : 2) Feeling down, depressed, or hopeless [0] : 2) Feeling down, depressed, or hopeless: Not at all (0) [PHQ-2 Negative - No further assessment needed] : PHQ-2 Negative - No further assessment needed [[id number]] : 0

## 2025-03-18 NOTE — PHYSICAL EXAM
[Normal Sclera/Conjunctiva] : normal sclera/conjunctiva [Normal Outer Ear/Nose] : the outer ears and nose were normal in appearance [Normal Oropharynx] : the oropharynx was normal [No JVD] : no jugular venous distention [Normal] : normal rate, regular rhythm, normal S1 and S2 and no murmur heard [Submandibular Lymph Nodes Enlarged Bilaterally] : enlarged nodes bilaterally [No Focal Deficits] : no focal deficits [Normal Affect] : the affect was normal [Normal Insight/Judgement] : insight and judgment were intact [Cervical Lymph Nodes Enlarged Posterior Bilaterally] : nodes not enlarged [de-identified] : Congested

## 2025-04-25 NOTE — HISTORY OF PRESENT ILLNESS
[Home] : at home, [unfilled] , at the time of the visit. [Medical Office: ([facility])___] : at the medical office located in  [Telehealth (audio & video)] : This visit was provided via telehealth using real-time 2-way audio visual technology. [Verbal consent obtained from patient] : the patient, [unfilled] [FreeTextEntry1] : 61 yo WM  hx of Type 2 DM uncontrolled, noncompliant patient, today in follow up, did not take am insulin as he was "in a hurry" reports forgetting am shot about twice a week  feels tired, weak , despite decreasing Trulicity to 1,5mg every other week as 4.5mg was making him nauseated and having no appetite.  patient stopped Trulicity as he was concerned that he lost too much weight, restarted 2 weeks ago  was taking Humalog only once a day and no oral antidiabetics , denies depression, but has had polyuria, polydipsia and weight loss per pt and his wife today present  missing evening insulin and morning one about 4-5 times in the last month per pt  has h/o noncompliance   last seen May 2020       was  on Ozempic 1mg weekly started 2/1/19 no side effects this replaced Tanzeum stopped May 2020 misunderstood my advise today A1c out of control 10.5        Invokana 300mg tab TAKE 1 TABLET BY MOUTH EVERY DAY however in the chat last Rx was 2/2020          Metformin 1g bid Pioglitazone 45mg qam  Humalog mix 75/25 10 units before breakfast and 10units before dinner bid  Trulicity 3 mg q week           Hx of Type 2 DM for 4-5 years         Repeat A1c 8.9 12/18, 8.4 on 2/20        prior regimen : " VGo 40 since 9/2016, On Actoplus 15-850mg PO BID, Invokana 300mg PO Qdaily. Tolerating medications well with no sign. side effects. Drinking plenty of water. Takes at night. Only wakes up 1-2 x per night. No UTI, Fungal, No dizziness, no hematuria."        Did not bring in meter or glucometer. I check "if I feel a little off"        Checks about 2-3 x per week        Checks BG levels via freestyle yadi getting better for the last few days in 150-200s                BP well controlled. 128/70. Currently on no ACEi.         Cr 0.77, eGFR >60. AST 16, ALT 24, Alk phos 101.         Currently on Lipitor and Lovaza 2g PO Qdaily.        Last opthalmology 1months ago. Dr. Starkey. Wears reading glasses. No blurry vision or loss of vision.         Denies neuropathy.         TSH 1.15        thyroid US no nodules 5/2015        Hgb 17.0 nl is 16.3, Hct 50.4. No snoring at night. Sent for Sleep study with Shivani Harvey. Neg for sleep apnea.         Blood work completed 12/19/16:        WBC 10.2, Hgb 17.0, Hct 50.4,Plt 268        UA + glucose (on invokana)        CMP: Na 142, k 4.5, bun 9, cr 0.77, LFTs nl        ,  not fasting  4/23/24 follow up-  Sugar 160 today.  A1c went up to 9.7%.  Freestyle Yadi 3 reviewed with pt. He has highs above 350.  When numbers are normal he does not feel good. Not used to it. Feels symptomatic at 115.  He does enjoy using the Yadi and finds it helpful.   Meds:  - Humalog Mix 14-15 U before breakfast and 14-15 U before dinner. Reports 90% compliance. He takes it at different times every day depending on his work schedule. Sometimes has to eat at work without his medications and then waits until getting home to take the shot.  - Trulicity 1.5 mg every other week. Switched from weekly to biweekly because he was losing weight.  - Pioglitazone 45 mg qd  - Metformin 500 mg BID  - Invokana 300 mg qd   01/14/25 He is doing okay. He pulled his back right and had to an epidural from Dr. Ngo in his spine a week before Christmas. MRI Lumbar Spine was done in December 2024. He was on Gabapentin and muscle relaxants. He states he is frequently tired. He feels he is experiencing polyuria and polydipsia. He denies any chest pain, heart racing, double vision, blurry vision.   03/10/25 Patient is okay. He pulled a muscle in his back while helping nephew. Saw Dr. Ngo and got a steroid shot February 4th.  04/25/24 Patient is doing well, no active complaints.

## 2025-04-25 NOTE — END OF VISIT
[Time Spent: ___ minutes] : I have spent [unfilled] minutes of time on the encounter which excludes teaching and separately reported services. [FreeTextEntry3] :  I, Meagan Ireland, am scribing for and in the presence of Dr. Mirlande Garcia in the following sections: HISTORY OF PRESENT ILLNESS; REVIEW OF SYSTEMS; PHYSICAL EXAM; ASSESSMENT/ PLAN. I, Mirlande Garcia, personally performed the services described in the documentation, reviewed the documentation recorded by the scribe in my presence, and it accurately and completely records my words and actions. 04/25/25

## 2025-04-25 NOTE — PHYSICAL EXAM
[Alert] : alert [Well Nourished] : well nourished [No Acute Distress] : no acute distress [Well Developed] : well developed [No Respiratory Distress] : no respiratory distress [No Rash] : no rash [Oriented x3] : oriented to person, place, and time [Acanthosis Nigricans] : no acanthosis nigricans [de-identified] : Slight heart murmur [de-identified] : on face

## 2025-07-25 NOTE — PHYSICAL EXAM
[Alert] : alert [Well Nourished] : well nourished [No Acute Distress] : no acute distress [Well Developed] : well developed [No Respiratory Distress] : no respiratory distress [No Rash] : no rash [Oriented x3] : oriented to person, place, and time [Acanthosis Nigricans] : no acanthosis nigricans [de-identified] : on face

## 2025-07-25 NOTE — PHYSICAL EXAM
[Alert] : alert [Well Nourished] : well nourished [No Acute Distress] : no acute distress [Well Developed] : well developed [No Respiratory Distress] : no respiratory distress [No Rash] : no rash [Oriented x3] : oriented to person, place, and time [Acanthosis Nigricans] : no acanthosis nigricans [de-identified] : on face

## 2025-07-25 NOTE — HISTORY OF PRESENT ILLNESS
[Medical Office: ([facility])___] : at the medical office located in  [Telehealth (audio & video)] : This visit was provided via telehealth using real-time 2-way audio visual technology. [Verbal consent obtained from patient] : the patient, [unfilled] [Other Location: e.g. School (Enter Location, City,State)___] : at [unfilled], at the time of the visit. [FreeTextEntry1] : 59 yo   hx of Type 2 DM uncontrolled, noncompliant patient, today in follow up, did not take am insulin as he was "in a hurry" reports forgetting am shot about twice a week  feels tired, weak , despite decreasing Trulicity to 1,5mg every other week as 4.5mg was making him nauseated and having no appetite.  patient stopped Trulicity as he was concerned that he lost too much weight, restarted 2 weeks ago  was taking Humalog only once a day and no oral antidiabetics , denies depression, but has had polyuria, polydipsia and weight loss per pt and his wife today present  missing evening insulin and morning one about 4-5 times in the last month per pt  has h/o noncompliance   last seen May 2020       was  on Ozempic 1mg weekly started 2/1/19 no side effects this replaced Tanzeum stopped May 2020 misunderstood my advise today A1c out of control 10.5  Trulicity 1.5 mg sc weekly Humalog 10 units before each meal adjusted accordingly per pt 07/25/25 12...12...15+ adjustment based on meals Tresiba 40 units qhs Pioglitazone 45 mg 1 tab os qd Metformin 2 tablets bid Invokana 300 mg 1 tab qd         Hx of Type 2 DM for 4-5 years         Repeat A1c 8.9 12/18, 8.4 on 2/20        prior regimen : " VGo 40 since 9/2016, On Actoplus 15-850mg PO BID, Invokana 300mg PO Qdaily. Tolerating medications well with no sign. side effects. Drinking plenty of water. Takes at night. Only wakes up 1-2 x per night. No UTI, Fungal, No dizziness, no hematuria."        Did not bring in meter or glucometer. I check "if I feel a little off"        Checks about 2-3 x per week        Checks BG levels via freestyle kallie getting better for the last few days in 150-200s                BP well controlled. 128/70. Currently on no ACEi.         Cr 0.77, eGFR >60. AST 16, ALT 24, Alk phos 101.         Currently on Lipitor and Lovaza 2g PO Qdaily.        Last opthalmology 1months ago. Dr. Starkey. Wears reading glasses. No blurry vision or loss of vision.         Denies neuropathy.         TSH 1.15        thyroid US no nodules 5/2015        Hgb 17.0 nl is 16.3, Hct 50.4. No snoring at night. Sent for Sleep study with Shivani Harvey. Neg for sleep apnea.         Blood work completed 12/19/16:        WBC 10.2, Hgb 17.0, Hct 50.4,Plt 268        UA + glucose (on invokana)        CMP: Na 142, k 4.5, bun 9, cr 0.77, LFTs nl        ,  not fasting   07/25/2025  Patient is doing very well. He is away in the [place] today.  B12 levels low normal. Recommend B12 lozenges 1000 mcg under tongue. Advised if stomach does not absorb will need to do monthly B12 shots.  Will check for pernicious anemia for next time.

## 2025-07-25 NOTE — END OF VISIT
[Time Spent: ___ minutes] : I have spent [unfilled] minutes of time on the encounter which excludes teaching and separately reported services. [FreeTextEntry3] :  I, Meagan Ireland, am scribing for and in the presence of Dr. Mirlande Garcia in the following sections: HISTORY OF PRESENT ILLNESS; REVIEW OF SYSTEMS; PHYSICAL EXAM; ASSESSMENT/ PLAN.I, Mirlande Garcia, personally performed the services described in the documentation, reviewed the documentation recorded by the scribe in my presence, and it accurately and completely records my words and actions. 07/25/25

## 2025-07-25 NOTE — HISTORY OF PRESENT ILLNESS
[Medical Office: ([facility])___] : at the medical office located in  [Telehealth (audio & video)] : This visit was provided via telehealth using real-time 2-way audio visual technology. [Verbal consent obtained from patient] : the patient, [unfilled] [Other Location: e.g. School (Enter Location, City,State)___] : at [unfilled], at the time of the visit. [FreeTextEntry1] : 61 yo   hx of Type 2 DM uncontrolled, noncompliant patient, today in follow up, did not take am insulin as he was "in a hurry" reports forgetting am shot about twice a week  feels tired, weak , despite decreasing Trulicity to 1,5mg every other week as 4.5mg was making him nauseated and having no appetite.  patient stopped Trulicity as he was concerned that he lost too much weight, restarted 2 weeks ago  was taking Humalog only once a day and no oral antidiabetics , denies depression, but has had polyuria, polydipsia and weight loss per pt and his wife today present  missing evening insulin and morning one about 4-5 times in the last month per pt  has h/o noncompliance   last seen May 2020       was  on Ozempic 1mg weekly started 2/1/19 no side effects this replaced Tanzeum stopped May 2020 misunderstood my advise today A1c out of control 10.5  Trulicity 1.5 mg sc weekly Humalog 10 units before each meal adjusted accordingly per pt 07/25/25 12...12...15+ adjustment based on meals Tresiba 40 units qhs Pioglitazone 45 mg 1 tab os qd Metformin 2 tablets bid Invokana 300 mg 1 tab qd         Hx of Type 2 DM for 4-5 years         Repeat A1c 8.9 12/18, 8.4 on 2/20        prior regimen : " VGo 40 since 9/2016, On Actoplus 15-850mg PO BID, Invokana 300mg PO Qdaily. Tolerating medications well with no sign. side effects. Drinking plenty of water. Takes at night. Only wakes up 1-2 x per night. No UTI, Fungal, No dizziness, no hematuria."        Did not bring in meter or glucometer. I check "if I feel a little off"        Checks about 2-3 x per week        Checks BG levels via freestyle kallie getting better for the last few days in 150-200s                BP well controlled. 128/70. Currently on no ACEi.         Cr 0.77, eGFR >60. AST 16, ALT 24, Alk phos 101.         Currently on Lipitor and Lovaza 2g PO Qdaily.        Last opthalmology 1months ago. Dr. Starkey. Wears reading glasses. No blurry vision or loss of vision.         Denies neuropathy.         TSH 1.15        thyroid US no nodules 5/2015        Hgb 17.0 nl is 16.3, Hct 50.4. No snoring at night. Sent for Sleep study with Shivani Harvey. Neg for sleep apnea.         Blood work completed 12/19/16:        WBC 10.2, Hgb 17.0, Hct 50.4,Plt 268        UA + glucose (on invokana)        CMP: Na 142, k 4.5, bun 9, cr 0.77, LFTs nl        ,  not fasting   07/25/2025  Patient is doing very well. He is away in the [place] today.  B12 levels low normal. Recommend B12 lozenges 1000 mcg under tongue. Advised if stomach does not absorb will need to do monthly B12 shots.  Will check for pernicious anemia for next time.